# Patient Record
Sex: FEMALE | Race: AMERICAN INDIAN OR ALASKA NATIVE
[De-identification: names, ages, dates, MRNs, and addresses within clinical notes are randomized per-mention and may not be internally consistent; named-entity substitution may affect disease eponyms.]

---

## 2018-01-09 ENCOUNTER — HOSPITAL ENCOUNTER (EMERGENCY)
Dept: HOSPITAL 5 - ED | Age: 23
Discharge: HOME | End: 2018-01-09
Payer: COMMERCIAL

## 2018-01-09 VITALS — SYSTOLIC BLOOD PRESSURE: 113 MMHG | DIASTOLIC BLOOD PRESSURE: 71 MMHG

## 2018-01-09 DIAGNOSIS — F17.200: ICD-10-CM

## 2018-01-09 DIAGNOSIS — R91.8: Primary | ICD-10-CM

## 2018-01-09 LAB
ALBUMIN SERPL-MCNC: 4.6 G/DL (ref 3.9–5)
ALT SERPL-CCNC: < 5 UNITS/L (ref 7–56)
BACTERIA #/AREA URNS HPF: (no result) /HPF
BASOPHILS # (AUTO): 0 K/MM3 (ref 0–0.1)
BASOPHILS NFR BLD AUTO: 0.1 % (ref 0–1.8)
BILIRUB UR QL STRIP: (no result)
BLOOD UR QL VISUAL: (no result)
BUN SERPL-MCNC: 15 MG/DL (ref 7–17)
BUN/CREAT SERPL: 25 %
CALCIUM SERPL-MCNC: 9.5 MG/DL (ref 8.4–10.2)
EOSINOPHIL # BLD AUTO: 0 K/MM3 (ref 0–0.4)
EOSINOPHIL NFR BLD AUTO: 0.4 % (ref 0–4.3)
HCT VFR BLD CALC: 42.4 % (ref 30.3–42.9)
HEMOLYSIS INDEX: 5
HGB BLD-MCNC: 14.3 GM/DL (ref 10.1–14.3)
LIPASE SERPL-CCNC: 22 UNITS/L (ref 13–60)
LYMPHOCYTES # BLD AUTO: 0.6 K/MM3 (ref 1.2–5.4)
LYMPHOCYTES NFR BLD AUTO: 5.4 % (ref 13.4–35)
MCH RBC QN AUTO: 32 PG (ref 28–32)
MCHC RBC AUTO-ENTMCNC: 34 % (ref 30–34)
MCV RBC AUTO: 95 FL (ref 79–97)
MONOCYTES # (AUTO): 0.8 K/MM3 (ref 0–0.8)
MONOCYTES % (AUTO): 8 % (ref 0–7.3)
MUCOUS THREADS #/AREA URNS HPF: (no result) /HPF
NITRITE UR QL STRIP: (no result)
PH UR STRIP: 5 [PH] (ref 5–7)
PLATELET # BLD: 238 K/MM3 (ref 140–440)
RBC # BLD AUTO: 4.48 M/MM3 (ref 3.65–5.03)
RBC #/AREA URNS HPF: 1 /HPF (ref 0–6)
UROBILINOGEN UR-MCNC: < 2 MG/DL (ref ?–2)
WBC #/AREA URNS HPF: 3 /HPF (ref 0–6)

## 2018-01-09 PROCEDURE — 85379 FIBRIN DEGRADATION QUANT: CPT

## 2018-01-09 PROCEDURE — 87400 INFLUENZA A/B EACH AG IA: CPT

## 2018-01-09 PROCEDURE — 85025 COMPLETE CBC W/AUTO DIFF WBC: CPT

## 2018-01-09 PROCEDURE — 36415 COLL VENOUS BLD VENIPUNCTURE: CPT

## 2018-01-09 PROCEDURE — 80053 COMPREHEN METABOLIC PANEL: CPT

## 2018-01-09 PROCEDURE — 99284 EMERGENCY DEPT VISIT MOD MDM: CPT

## 2018-01-09 PROCEDURE — 93005 ELECTROCARDIOGRAM TRACING: CPT

## 2018-01-09 PROCEDURE — 81025 URINE PREGNANCY TEST: CPT

## 2018-01-09 PROCEDURE — 71046 X-RAY EXAM CHEST 2 VIEWS: CPT

## 2018-01-09 PROCEDURE — 81001 URINALYSIS AUTO W/SCOPE: CPT

## 2018-01-09 PROCEDURE — 84484 ASSAY OF TROPONIN QUANT: CPT

## 2018-01-09 PROCEDURE — 83690 ASSAY OF LIPASE: CPT

## 2018-01-09 PROCEDURE — 82150 ASSAY OF AMYLASE: CPT

## 2018-01-09 PROCEDURE — 93010 ELECTROCARDIOGRAM REPORT: CPT

## 2018-01-09 NOTE — EMERGENCY DEPARTMENT REPORT
ED N/V/D HPI





- General


Chief complaint: Nausea/Vomiting/Diarrhea


Stated complaint: SICK, VOMITING XTHIS AM


Time Seen by Provider: 01/09/18 15:45


Source: patient


Mode of arrival: Ambulatory


Limitations: No Limitations





- History of Present Illness


Initial comments: 





23 y/o nontoxic in appearance F presents to the ER complaining of lower 

abdominal pain, nausea, vomiting since this morning.  Pt states that she does 

not get her menstrual cycle monthly, but states she gets them every 3 months.  

She states that her cycle started yesterday.  Since this morning she states 

that she has been coughing up and vomiting "black stuff mixed with blood."  Pt 

states that last night she ate shrimp and rice, which is not anything out of 

the ordinary.  Pt also states that she has been having URI symptoms such as: 

congestion, productive cough for the past 1.5 months.  Pt reports to chest 

discomfort with the cough and a periodic sharp pain that comes and goes every 

now and then that she states radiates to her back. No recent long travels, hx 

of blood clots, immobility, or cancer history.  LMP:1/8/18 NKDA.


MD complaint: nausea, vomiting, diarrhea, abdominal pain


-: days(s) (1)


Description of Vomiting: bilious


Description of Diarrhea: green


Associated Abdominal Pain: Yes


Location: LLQ, RLQ


Radiation: none


Severity: moderate, severe


Pain Scale: 7


Quality: constant


Consistency: constant


Improves with: none


Worsens with: none


Associated Symptoms: myalgias, cough, nausea/vomiting, other (chest discomfort 

with the cough).  denies: fever/chills, headaches, loss of appetite, rash, 

dysuria, shortness of breath





- Related Data


 Previous Rx's











 Medication  Instructions  Recorded  Last Taken  Type


 


Azithromycin [Zithromax Z-BEAN] 250 mg PO DAILY #6 tablet 01/09/18 Unknown Rx


 


Cephalexin [Keflex] 500 mg PO Q12HR #20 cap 01/09/18 Unknown Rx


 


Ibuprofen [Motrin 600 MG tab] 600 mg PO Q8H PRN #15 tablet 01/09/18 Unknown Rx











 Allergies











Allergy/AdvReac Type Severity Reaction Status Date / Time


 


No Known Allergies Allergy   Unverified 01/09/18 09:25














ED Review of Systems


ROS: 


Stated complaint: SICK, VOMITING XTHIS AM


Other details as noted in HPI





Constitutional: chills.  denies: fever, malaise


Eyes: denies: eye pain, eye discharge, vision change


ENT: denies: ear pain, throat pain


Respiratory: cough, other (chest discomfort with a cough)


Cardiovascular: chest pain (states that she periodically has been having on and 

off chest discomfort with this)


Gastrointestinal: abdominal pain, nausea, vomiting, diarrhea, hematemesis.  

denies: melena, hematochezia


Genitourinary: denies: urgency, dysuria, discharge


Musculoskeletal: denies: back pain, joint swelling, arthralgia


Skin: denies: rash, lesions


Neurological: denies: headache, weakness, paresthesias


Psychiatric: denies: anxiety, depression


Hematological/Lymphatic: denies: easy bleeding, easy bruising





ED Past Medical Hx





- Past Medical History


Previous Medical History?: No





- Surgical History


Past Surgical History?: No





- Social History


Smoking Status: Current Every Day Smoker


Substance Use Type: Alcohol, Non Opiate Pain, Other





- Medications


Home Medications: 


 Home Medications











 Medication  Instructions  Recorded  Confirmed  Last Taken  Type


 


Azithromycin [Zithromax Z-BEAN] 250 mg PO DAILY #6 tablet 01/09/18  Unknown Rx


 


Cephalexin [Keflex] 500 mg PO Q12HR #20 cap 01/09/18  Unknown Rx


 


Ibuprofen [Motrin 600 MG tab] 600 mg PO Q8H PRN #15 tablet 01/09/18  Unknown Rx














ED Physical Exam





- General


Limitations: No Limitations


General appearance: alert, in no apparent distress, other (pt was laughing and 

speaking with me and her mom)





- Head


Head exam: Present: atraumatic, normocephalic, normal inspection





- Eye


Eye exam: Present: normal appearance, PERRL, EOMI


Pupils: Present: normal accommodation





- ENT


ENT exam: Present: mucous membranes moist





- Neck


Neck exam: Present: normal inspection, full ROM, other (no signs of meningitis, 

able to flex and extend the neck)





- Respiratory


Respiratory exam: Present: normal lung sounds bilaterally.  Absent: respiratory 

distress





- Cardiovascular


Cardiovascular Exam: Present: regular rate, normal rhythm.  Absent: systolic 

murmur, diastolic murmur, rubs, gallop





- GI/Abdominal


GI/Abdominal exam: Present: soft, tenderness (noted at the LLQ and RLQ), normal 

bowel sounds, hyperactive bowel sounds (MILDLY), other (negative psoas, 

obturator, murphys, no pain at the mcburneys point).  Absent: guarding, rebound

, hypoactive bowel sounds, organomegaly, mass, bruit, pulsatile mass, hernia





- Extremities Exam


Extremities exam: Present: normal inspection, other (negative sabiha sign, no 

leg or calf pain, no swelling bilaterally)





- Back Exam


Back exam: Present: normal inspection, full ROM, other (no low back pain).  

Absent: CVA tenderness (R), CVA tenderness (L)





- Neurological Exam


Neurological exam: Present: alert, oriented X3, CN II-XII intact, normal gait





- Expanded Neurological Exam


  ** Expanded


Patient oriented to: Present: person, place, time


Speech: Present: fluid speech


Best Eye Response (Jimena): (4) open spontaneously


Best Motor Response (Charlemont): (6) obeys commands


Best Verbal Response (Jimena): (5) oriented


Jimena Total: 15





- Psychiatric


Psychiatric exam: Present: normal affect, normal mood





- Skin


Skin exam: Present: warm, dry, intact, normal color.  Absent: rash





ED Course


 Vital Signs











  01/09/18 01/09/18 01/09/18





  09:25 19:31 20:54


 


Temperature 98.1 F  


 


Pulse Rate 83 93 H 


 


Respiratory 16 16 18





Rate   


 


Blood Pressure 127/77 113/71 


 


O2 Sat by Pulse 99 99 





Oximetry   














ED Medical Decision Making





- Lab Data


Result diagrams: 


 01/09/18 10:35





 01/09/18 10:35





- EKG Data


EKG shows normal: sinus rhythm


Rate: normal





- EKG Data


When compared to previous EKG there are: previous EKG unavailable





01/09/18 22:11


Rate: 89- sinus rhythm, short IN interval, RSR' in V1 OR V2, probably normal 

variant; no stemi


IN: 103


QRSD: 75


QT: 357


QTc: 435





Axis:


P: 50


QRS: 70


T: 60





01/11/18 10:15








- Radiology Data


Radiology results: report reviewed





Chest XR:  subsegmental infiltrate versus atelectasis left lower lobe 

posteriorly inferiorly.  Heart size upper normal.  No other abnormalities are 

identified.  





- Medical Decision Making





As the patient presented with numerous complaints including Chest discomfort 

and abdominal pain an extensive work-up was conducted including: Ddimer, 

influenza, UA, CXR, EKG, amylase, lipase, troponin T, CBC, and CMP. Pt was 

observed by myself and Dr. Perdomo.  Pt was educated that Ibuprofen can also 

cause stomach upset and told to only take as needed for any pain, she was 

advised to take probiotics with this medication along with the antibiotics to 

avoid increased stomach upset.  She was advised to undergo the BRAT diet.  Due 

to the fact that an IV was not able to be started here in the ED due to small 

veins I have encouraged copious fluids for hydration.  I have given her Zofran 

4 mg and and Ibuprofen 600 mg for the pain, pt states that her symptoms have 

subsided. Due to the possibility of walking pneumonia we have treated with 

antibitiocs. Pt was discharged home with a z-bean, keflex, and ibuprofen as 

needed for the pain.  She was provided with referrals for GI and pulmonary for 

repeat imaging and continued symptoms. Pt was discharged in stable condition, 

alert and oriented, in no resp distress.  


Critical care attestation.: 


If time is entered above; I have spent that time in minutes in the direct care 

of this critically ill patient, excluding procedure time.








ED Disposition


Clinical Impression: 


 Pulmonary infiltrate





Abdominal pain


Qualifiers:


 Abdominal location: unspecified location Qualified Code(s): R10.9 - 

Unspecified abdominal pain





Disposition: DC-01 TO HOME OR SELFCARE


Is pt being admited?: No


Does the pt Need Aspirin: No


Condition: Stable


Instructions:  Acute Nausea and Vomiting (ED), Abdominal Pain (ED)


Additional Instructions: 


I would recommend BRAT= bread, rice, apple, and toast.  Drink plenty of fluids 

and pedialyte.  Please follow-up PCP within 3-5 days.  You have been provided 

with GI and pulmonary referrals.  Please return to the ER immediately if your 

presenting symptoms acutely progress or worsen. 


Prescriptions: 


Azithromycin [Zithromax Z-BEAN] 250 mg PO DAILY #6 tablet


Cephalexin [Keflex] 500 mg PO Q12HR #20 cap


Ibuprofen [Motrin 600 MG tab] 600 mg PO Q8H PRN #15 tablet


 PRN Reason: Pain


Referrals: 


[facility] [Outside] - 3-5 Days


[facility] [Outside] - 3-5 Days


ROXANNE CALL MD [Staff Physician] - 3-5 Days


GET HAIRSTON MD [Staff Physician] - 3-5 Days


PRIMARY CARE,MD [Primary Care Provider] - 3-5 Days


Forms:  Work/School Release Form(ED)

## 2018-01-09 NOTE — XRAY REPORT
FINAL REPORT



PROCEDURE:  XR CHEST ROUTINE 2V



TECHNIQUE:  PA and lateral chest radiographs were obtained. CPT

[id number]







HISTORY:  productive cough 



COMPARISON:  No prior studies are available for comparison.



FINDINGS:  

Small patchy alveolar density present in the posterior segment of

the left lower lobe suggesting a subsegmental infiltrate or area

of atelectasis. Lungs otherwise are clear. No effusions are seen.



Heart size upper normal. Pulmonary vasculature not distended. No

evidence of pulmonary edema. No acute bony abnormalities are

identified. 



IMPRESSION:  

Subsegmental infiltrate versus atelectasis left lower lobe

posteriorly inferiorly. Heart size upper normal. No other

abnormalities are identified.